# Patient Record
Sex: MALE | Race: WHITE | NOT HISPANIC OR LATINO
[De-identification: names, ages, dates, MRNs, and addresses within clinical notes are randomized per-mention and may not be internally consistent; named-entity substitution may affect disease eponyms.]

---

## 2023-12-20 ENCOUNTER — APPOINTMENT (OUTPATIENT)
Dept: ORTHOPEDIC SURGERY | Facility: CLINIC | Age: 23
End: 2023-12-20
Payer: COMMERCIAL

## 2023-12-20 ENCOUNTER — TRANSCRIPTION ENCOUNTER (OUTPATIENT)
Age: 23
End: 2023-12-20

## 2023-12-20 VITALS — BODY MASS INDEX: 18.28 KG/M2 | HEIGHT: 72 IN | WEIGHT: 135 LBS

## 2023-12-20 DIAGNOSIS — Z87.39 PERSONAL HISTORY OF OTHER DISEASES OF THE MUSCULOSKELETAL SYSTEM AND CONNECTIVE TISSUE: ICD-10-CM

## 2023-12-20 DIAGNOSIS — Z78.9 OTHER SPECIFIED HEALTH STATUS: ICD-10-CM

## 2023-12-20 PROBLEM — Z00.00 ENCOUNTER FOR PREVENTIVE HEALTH EXAMINATION: Status: ACTIVE | Noted: 2023-12-20

## 2023-12-20 PROCEDURE — 73560 X-RAY EXAM OF KNEE 1 OR 2: CPT | Mod: RT

## 2023-12-20 PROCEDURE — 99203 OFFICE O/P NEW LOW 30 MIN: CPT

## 2023-12-20 NOTE — HISTORY OF PRESENT ILLNESS
[6] : 6 [3] : 3 [de-identified] : Patient is here today for the right knee pain for the past 1 mos s/p injury playing flag football. He notes pain anterior and mediala spect of the knee and feels like the knee wont support him.  He has tried advil 800 mg TID.  He has tried HEP without any relief.  [] : no

## 2023-12-20 NOTE — DISCUSSION/SUMMARY
[de-identified] : Extensive discussion of the options was had with the patient. This discussion included both surgical and nonsurgical options. Options including but not limited to cortisone injection, viscosupplementation, physical therapy, oral anti-inflammatories, MRI, arthroplasty and arthroscopy were discussed with the patient. We also discussed the option of observation, allowing the patient to continue rest, ice, NSAIDs and following up if the condition does not improve. Time was taken to go over any questions the patient had in regards to any of the treatment plans described. Plan is for MRI Right knee to r/o MMT.

## 2023-12-20 NOTE — PHYSICAL EXAM
[Right] : right knee [There are no fractures, subluxations or dislocations. No significant abnormalities are seen] : There are no fractures, subluxations or dislocations. No significant abnormalities are seen [de-identified] : Constitutional: The patient appears well developed, well nourished. Examination of patients ability to communicate functionally was normal.       Neurologic: Coordination is normal. Alert and oriented to time, place and person. No evidence of mood disorder, calm affect.           RIGHT  KNEE: Inspection of the knee is as follows: mild effusion. no ecchymosis, no streaking, no erythema, no atrophy, no deformities of the quad tendon and no deformities of patellar tendon.       Palpation of the knee is as follows: MOST TTP PF .  Positive  medial joint line tenderness. no obvious defects, no palpable masses, no increased warmth and no crepitus.       Knee Range of Motion is as follows in degrees:       Extension: 0    Flexion: 135      Strength examination of the knee is as follows:       Quadriceps strength is 5/5   Hamstring strength is 5/5       Ligament Stability and Special Test:  positive McMurrays test. ligamentously stable, negative anterior draw, negative Lachman test, negative posterior draw and no varus or valgus instability. patella tracks well and able to do active straight leg raise without an extensor lag.       Neurological examination of the knee is as follows: light touch is intact throughout.       Gait and function is as follows: non-antalgic gait.

## 2023-12-22 ENCOUNTER — APPOINTMENT (OUTPATIENT)
Dept: ORTHOPEDIC SURGERY | Facility: CLINIC | Age: 23
End: 2023-12-22

## 2024-01-03 ENCOUNTER — APPOINTMENT (OUTPATIENT)
Dept: ORTHOPEDIC SURGERY | Facility: CLINIC | Age: 24
End: 2024-01-03
Payer: COMMERCIAL

## 2024-01-03 PROCEDURE — 99214 OFFICE O/P EST MOD 30 MIN: CPT

## 2024-01-03 NOTE — PHYSICAL EXAM
[de-identified] : Constitutional: The patient appears well developed, well nourished. Examination of patients ability to communicate functionally was normal.       Neurologic: Coordination is normal. Alert and oriented to time, place and person. No evidence of mood disorder, calm affect.           RIGHT  KNEE: Inspection of the knee is as follows: mild effusion. no ecchymosis, no streaking, no erythema, no atrophy, no deformities of the quad tendon and no deformities of patellar tendon.       Palpation of the knee is as follows:. no obvious defects, no palpable masses, no increased warmth and no crepitus.       Knee Range of Motion is as follows in degrees:       Extension: 0    Flexion: 135      Strength examination of the knee is as follows:       Quadriceps strength is 5/5   Hamstring strength is 5/5       Ligament Stability and Special Test: NEG McMurrays test. ligamentously stable, negative anterior draw, negative Lachman test, negative posterior draw and no varus or valgus instability. patella tracks well and able to do active straight leg raise without an extensor lag.       Neurological examination of the knee is as follows: light touch is intact throughout.       Gait and function is as follows: non-antalgic gait.

## 2024-01-03 NOTE — REASON FOR VISIT
[FreeTextEntry2] : MRI impressions R knee: Focal slight impaction fracture deformity involving the anterior aspect of the lateral femoral condyle with underlying marrow edema. Associated corresponding marrow edema involving the anterolateral proximal tibia most consistent with osseous contusion. Intrasubstance tear involving the posterior horn of the medial meniscus extending to the periphery, extending into the posterior meniscocapsular junction. Mild distal quadriceps tendinosis.

## 2024-01-03 NOTE — HISTORY OF PRESENT ILLNESS
[de-identified] : following up for the right knee. Patient is here today for MRI results.  Patient states the knee pain is much improved since his injury.  he admits he has been resting it up with very good relief.

## 2024-01-03 NOTE — DISCUSSION/SUMMARY
[de-identified] : Patient is feeling much better  At this time the plan is for the patient to observe and continue self care including but not limited to HEP, Ice, NSAIDs and rest as needed. Patient was instructed to f/u if their symptoms do not improve or if there are any further concerns.   Entered by Yosvany Seth acting as scribe. Dr. Villareal Attestation The documentation recorded by the scribe, in my presence, accurately reflects the service I, Dr. Villareal, personally performed, and the decisions made by me with my edits as appropriate.

## 2024-03-11 ENCOUNTER — APPOINTMENT (OUTPATIENT)
Dept: ORTHOPEDIC SURGERY | Facility: CLINIC | Age: 24
End: 2024-03-11
Payer: COMMERCIAL

## 2024-03-11 DIAGNOSIS — S83.241A OTHER TEAR OF MEDIAL MENISCUS, CURRENT INJURY, RIGHT KNEE, INITIAL ENCOUNTER: ICD-10-CM

## 2024-03-11 PROCEDURE — 99213 OFFICE O/P EST LOW 20 MIN: CPT

## 2024-03-11 NOTE — PHYSICAL EXAM
[de-identified] : Constitutional: The patient appears well developed, well nourished. Examination of patients ability to communicate functionally was normal.       Neurologic: Coordination is normal. Alert and oriented to time, place and person. No evidence of mood disorder, calm affect.           RIGHT  KNEE: Inspection of the knee is as follows: mild effusion. no ecchymosis, no streaking, no erythema, no atrophy, no deformities of the quad tendon and no deformities of patellar tendon.       Palpation of the knee is as follows:. no obvious defects, no palpable masses, no increased warmth and no crepitus.       Knee Range of Motion is as follows in degrees:       Extension: 0    Flexion: 135      Strength examination of the knee is as follows:       Quadriceps strength is 5/5   Hamstring strength is 5/5       Ligament Stability and Special Test: NEG McMurrays test. ligamentously stable, negative anterior draw, negative Lachman test, negative posterior draw and no varus or valgus instability. patella tracks well and able to do active straight leg raise without an extensor lag.       Neurological examination of the knee is as follows: light touch is intact throughout.       Gait and function is as follows: non-antalgic gait.

## 2024-03-11 NOTE — HISTORY OF PRESENT ILLNESS
[de-identified] : following up for the right knee. Patient was last seen 2 mos. ago and planned for observation.  Patient states the knee has improved since last visit however it has been slow. He still notes some discomfort inferior aspect of the knee infrapatellar.  He went for a jog 1/4 mile yesterday without any significant pain

## 2024-05-13 ENCOUNTER — APPOINTMENT (OUTPATIENT)
Dept: ORTHOPEDIC SURGERY | Facility: CLINIC | Age: 24
End: 2024-05-13